# Patient Record
Sex: FEMALE | Race: WHITE | NOT HISPANIC OR LATINO | URBAN - METROPOLITAN AREA
[De-identification: names, ages, dates, MRNs, and addresses within clinical notes are randomized per-mention and may not be internally consistent; named-entity substitution may affect disease eponyms.]

---

## 2020-07-06 ENCOUNTER — OFFICE VISIT (OUTPATIENT)
Dept: URBAN - METROPOLITAN AREA CLINIC 98 | Facility: CLINIC | Age: 80
End: 2020-07-06
Payer: MEDICARE

## 2020-07-06 DIAGNOSIS — K74.3 PRIMARY BILIARY CHOLANGITIS: ICD-10-CM

## 2020-07-06 PROCEDURE — G9903 PT SCRN TBCO ID AS NON USER: HCPCS | Performed by: INTERNAL MEDICINE

## 2020-07-06 PROCEDURE — G8417 CALC BMI ABV UP PARAM F/U: HCPCS | Performed by: INTERNAL MEDICINE

## 2020-07-06 PROCEDURE — G8427 DOCREV CUR MEDS BY ELIG CLIN: HCPCS | Performed by: INTERNAL MEDICINE

## 2020-07-06 PROCEDURE — 99213 OFFICE O/P EST LOW 20 MIN: CPT | Performed by: INTERNAL MEDICINE

## 2020-07-06 RX ORDER — METOPROLOL SUCCINATE 25 MG/1
TABLET, EXTENDED RELEASE ORAL
Qty: 0 | Refills: 0 | Status: ACTIVE | COMMUNITY
Start: 1900-01-01

## 2020-07-06 RX ORDER — LINACLOTIDE 72 UG/1
TAKE 1 CAPSULE BY ORAL ROUTE DAILY FOR 90 DAYS CAPSULE, GELATIN COATED ORAL 1
Qty: 90 | Refills: 3 | Status: ACTIVE | COMMUNITY
Start: 2019-08-27 | End: 2020-08-21

## 2020-07-06 RX ORDER — LOSARTAN POTASSIUM 100 MG/1
TABLET, FILM COATED ORAL
Qty: 0 | Refills: 0 | Status: ACTIVE | COMMUNITY
Start: 1900-01-01

## 2020-07-06 RX ORDER — URSODIOL 300 MG/1
TAKE 2 CAPSULES BY ORAL ROUTE 2 TIMES A DAY FOR 90 DAYS CAPSULE ORAL 2
Qty: 360 | Refills: 3 | Status: ACTIVE | COMMUNITY
Start: 2020-04-09 | End: 2021-04-04

## 2020-07-06 NOTE — HPI-OTHER HISTORIES
Labs :   GGT 9  LDL 67 Tchol 151 TG 64 hb 14.7 plt 170 cr 0.9 Tbili 0.6 [id number]  alt12 ast 22 vit d 40 tsh 3.4

## 2020-07-06 NOTE — HPI-TODAY'S VISIT:
Here to f/u; has gained some weight - cooking for grandson who is living w her now.  otherwise no new issues.  no more itching (except in vaginal area) .  tolerating Ursodiol - got a good deal on [facility]

## 2020-07-07 ENCOUNTER — OFFICE VISIT (OUTPATIENT)
Dept: URBAN - METROPOLITAN AREA CLINIC 98 | Facility: CLINIC | Age: 80
End: 2020-07-07

## 2020-12-16 LAB
A/G RATIO: 1.9
ALBUMIN: 4.1
ALKALINE PHOSPHATASE: 117
ALT (SGPT): 10
AST (SGOT): 21
BASO (ABSOLUTE): 0.1
BASOS: 1
BILIRUBIN, TOTAL: 0.5
BUN/CREATININE RATIO: 26
BUN: 24
CALCIUM: 9.5
CARBON DIOXIDE, TOTAL: 22
CHLORIDE: 106
CREATININE: 0.93
EGFR IF AFRICN AM: 68
EGFR IF NONAFRICN AM: 59
EOS (ABSOLUTE): 0.2
EOS: 4
GGT: 9
GLOBULIN, TOTAL: 2.2
GLUCOSE: 93
HEMATOCRIT: 44.6
HEMATOLOGY COMMENTS:: (no result)
HEMOGLOBIN: 14.7
IMMATURE CELLS: (no result)
IMMATURE GRANS (ABS): 0
IMMATURE GRANULOCYTES: 0
LYMPHS (ABSOLUTE): 1.6
LYMPHS: 28
MCH: 31.3
MCHC: 33
MCV: 95
MONOCYTES(ABSOLUTE): 0.5
MONOCYTES: 10
NEUTROPHILS (ABSOLUTE): 3.2
NEUTROPHILS: 57
NRBC: (no result)
PLATELETS: 178
POTASSIUM: 4.4
PROTEIN, TOTAL: 6.3
RBC: 4.69
RDW: 12.7
SODIUM: 142
WBC: 5.7

## 2021-01-04 ENCOUNTER — OFFICE VISIT (OUTPATIENT)
Dept: URBAN - METROPOLITAN AREA CLINIC 98 | Facility: CLINIC | Age: 81
End: 2021-01-04
Payer: MEDICARE

## 2021-01-04 VITALS
SYSTOLIC BLOOD PRESSURE: 143 MMHG | BODY MASS INDEX: 38.72 KG/M2 | DIASTOLIC BLOOD PRESSURE: 82 MMHG | WEIGHT: 210.4 LBS | TEMPERATURE: 96 F | HEART RATE: 66 BPM | HEIGHT: 62 IN

## 2021-01-04 DIAGNOSIS — L29.9 ITCHING: ICD-10-CM

## 2021-01-04 DIAGNOSIS — K74.3 PRIMARY BILIARY CHOLANGITIS: ICD-10-CM

## 2021-01-04 DIAGNOSIS — K59.09 CHRONIC CONSTIPATION: ICD-10-CM

## 2021-01-04 PROCEDURE — G8482 FLU IMMUNIZE ORDER/ADMIN: HCPCS | Performed by: INTERNAL MEDICINE

## 2021-01-04 PROCEDURE — G9903 PT SCRN TBCO ID AS NON USER: HCPCS | Performed by: INTERNAL MEDICINE

## 2021-01-04 PROCEDURE — G8417 CALC BMI ABV UP PARAM F/U: HCPCS | Performed by: INTERNAL MEDICINE

## 2021-01-04 PROCEDURE — G8427 DOCREV CUR MEDS BY ELIG CLIN: HCPCS | Performed by: INTERNAL MEDICINE

## 2021-01-04 PROCEDURE — 99214 OFFICE O/P EST MOD 30 MIN: CPT | Performed by: INTERNAL MEDICINE

## 2021-01-04 RX ORDER — LOSARTAN POTASSIUM 100 MG/1
TABLET, FILM COATED ORAL
Qty: 0 | Refills: 0 | Status: ACTIVE | COMMUNITY
Start: 1900-01-01

## 2021-01-04 RX ORDER — URSODIOL 300 MG/1
TAKE 2 CAPSULES BY ORAL ROUTE 2 TIMES A DAY FOR 90 DAYS CAPSULE ORAL 2
Qty: 360 | Refills: 3 | Status: ACTIVE | COMMUNITY
Start: 2020-04-09 | End: 2021-04-04

## 2021-01-04 RX ORDER — METOPROLOL SUCCINATE 25 MG/1
TABLET, EXTENDED RELEASE ORAL
Qty: 0 | Refills: 0 | COMMUNITY
Start: 1900-01-01

## 2021-01-04 NOTE — HPI-TODAY'S VISIT:
did itch some over grabiel  - but she thinks more due to stress. grandson lives with her  haven't been back to the pool.   going to orthopod - may need knee surgery bilaterally. shots are ineffective.

## 2021-01-06 ENCOUNTER — LAB OUTSIDE AN ENCOUNTER (OUTPATIENT)
Dept: URBAN - METROPOLITAN AREA CLINIC 98 | Facility: CLINIC | Age: 81
End: 2021-01-06

## 2021-05-27 ENCOUNTER — LAB OUTSIDE AN ENCOUNTER (OUTPATIENT)
Dept: URBAN - METROPOLITAN AREA CLINIC 98 | Facility: CLINIC | Age: 81
End: 2021-05-27

## 2021-05-28 LAB
A/G RATIO: 1.7
ALBUMIN: 3.8
ALKALINE PHOSPHATASE: 116
ALT (SGPT): 11
AST (SGOT): 24
BASO (ABSOLUTE): 0.1
BASOS: 1
BILIRUBIN, TOTAL: 0.4
BUN/CREATININE RATIO: 25
BUN: 25
CALCIUM: 9.4
CARBON DIOXIDE, TOTAL: 23
CHLORIDE: 109
CREATININE: 1
EGFR IF AFRICN AM: 62
EGFR IF NONAFRICN AM: 54
EOS (ABSOLUTE): 0.3
EOS: 7
GLOBULIN, TOTAL: 2.3
GLUCOSE: 100
HEMATOCRIT: 43.3
HEMATOLOGY COMMENTS:: (no result)
HEMOGLOBIN: 14.5
IMMATURE CELLS: (no result)
IMMATURE GRANS (ABS): 0
IMMATURE GRANULOCYTES: 0
LYMPHS (ABSOLUTE): 0.9
LYMPHS: 25
MCH: 31
MCHC: 33.5
MCV: 93
MONOCYTES(ABSOLUTE): 0.4
MONOCYTES: 10
NEUTROPHILS (ABSOLUTE): 2.2
NEUTROPHILS: 57
NRBC: (no result)
PLATELETS: 167
POTASSIUM: 4.4
PROTEIN, TOTAL: 6.1
RBC: 4.68
RDW: 13.1
SODIUM: 142
WBC: 3.8

## 2021-06-04 ENCOUNTER — LAB OUTSIDE AN ENCOUNTER (OUTPATIENT)
Dept: URBAN - METROPOLITAN AREA CLINIC 98 | Facility: CLINIC | Age: 81
End: 2021-06-04

## 2021-06-04 ENCOUNTER — OFFICE VISIT (OUTPATIENT)
Dept: URBAN - METROPOLITAN AREA CLINIC 98 | Facility: CLINIC | Age: 81
End: 2021-06-04
Payer: MEDICARE

## 2021-06-04 VITALS
TEMPERATURE: 97.3 F | DIASTOLIC BLOOD PRESSURE: 78 MMHG | BODY MASS INDEX: 37.32 KG/M2 | HEIGHT: 62 IN | HEART RATE: 65 BPM | SYSTOLIC BLOOD PRESSURE: 129 MMHG | WEIGHT: 202.8 LBS

## 2021-06-04 DIAGNOSIS — K74.3 PRIMARY BILIARY CHOLANGITIS: ICD-10-CM

## 2021-06-04 DIAGNOSIS — K59.09 CHRONIC CONSTIPATION: ICD-10-CM

## 2021-06-04 DIAGNOSIS — E55.9 VITAMIN D DEFICIENCY: ICD-10-CM

## 2021-06-04 DIAGNOSIS — Z86.010 PERSONAL HISTORY OF COLONIC POLYPS: ICD-10-CM

## 2021-06-04 PROBLEM — 443381000124105 OBESE CLASS II: Status: ACTIVE | Noted: 2021-06-03

## 2021-06-04 PROCEDURE — 99214 OFFICE O/P EST MOD 30 MIN: CPT | Performed by: INTERNAL MEDICINE

## 2021-06-04 RX ORDER — LOSARTAN POTASSIUM 100 MG/1
TABLET, FILM COATED ORAL
Qty: 0 | Refills: 0 | Status: ACTIVE | COMMUNITY
Start: 1900-01-01

## 2021-06-04 RX ORDER — URSODIOL 500 MG/1
1 TABLET TABLET ORAL TWICE A DAY
Qty: 180 TABLET | Refills: 3
Start: 2020-04-09

## 2021-06-04 RX ORDER — METOPROLOL SUCCINATE 25 MG/1
TABLET, EXTENDED RELEASE ORAL
Qty: 0 | Refills: 0 | COMMUNITY
Start: 1900-01-01

## 2021-06-04 RX ORDER — SODIUM, POTASSIUM,MAG SULFATES 17.5-3.13G
177ML SOLUTION, RECONSTITUTED, ORAL ORAL AS DIRECTED
Qty: 1 KIT | Refills: 0 | OUTPATIENT
Start: 2021-06-04 | End: 2021-06-05

## 2021-06-04 NOTE — HPI-TODAY'S VISIT:
Just diagnosed with lichen sclerosis - now in genital area.  rec'd clobetasol proprionate ointment.   still straining at stool.  still getting kylie.   doing WW - but  just got her first great grandchild.

## 2021-06-14 ENCOUNTER — TELEPHONE ENCOUNTER (OUTPATIENT)
Dept: URBAN - METROPOLITAN AREA CLINIC 23 | Facility: CLINIC | Age: 81
End: 2021-06-14

## 2021-06-15 ENCOUNTER — TELEPHONE ENCOUNTER (OUTPATIENT)
Dept: URBAN - METROPOLITAN AREA CLINIC 98 | Facility: CLINIC | Age: 81
End: 2021-06-15

## 2021-07-04 ENCOUNTER — LAB OUTSIDE AN ENCOUNTER (OUTPATIENT)
Dept: URBAN - METROPOLITAN AREA CLINIC 98 | Facility: CLINIC | Age: 81
End: 2021-07-04

## 2021-07-06 ENCOUNTER — OFFICE VISIT (OUTPATIENT)
Dept: URBAN - METROPOLITAN AREA CLINIC 98 | Facility: CLINIC | Age: 81
End: 2021-07-06

## 2021-07-21 ENCOUNTER — CLAIMS CREATED FROM THE CLAIM WINDOW (OUTPATIENT)
Dept: URBAN - METROPOLITAN AREA CLINIC 4 | Facility: CLINIC | Age: 81
End: 2021-07-21
Payer: MEDICARE

## 2021-07-21 ENCOUNTER — OFFICE VISIT (OUTPATIENT)
Dept: URBAN - METROPOLITAN AREA SURGERY CENTER 18 | Facility: SURGERY CENTER | Age: 81
End: 2021-07-21
Payer: MEDICARE

## 2021-07-21 DIAGNOSIS — D12.4 ADENOMA OF DESCENDING COLON: ICD-10-CM

## 2021-07-21 DIAGNOSIS — K63.89 POLYP OF ILEUM: ICD-10-CM

## 2021-07-21 DIAGNOSIS — Z86.010 H/O ADENOMATOUS POLYP OF COLON: ICD-10-CM

## 2021-07-21 DIAGNOSIS — D12.4 BENIGN NEOPLASM OF DESCENDING COLON: ICD-10-CM

## 2021-07-21 PROCEDURE — 88305 TISSUE EXAM BY PATHOLOGIST: CPT | Performed by: PATHOLOGY

## 2021-07-21 PROCEDURE — 45380 COLONOSCOPY AND BIOPSY: CPT | Performed by: INTERNAL MEDICINE

## 2021-07-21 PROCEDURE — G8907 PT DOC NO EVENTS ON DISCHARG: HCPCS | Performed by: INTERNAL MEDICINE

## 2021-07-21 RX ORDER — METOPROLOL SUCCINATE 25 MG/1
TABLET, EXTENDED RELEASE ORAL
Qty: 0 | Refills: 0 | COMMUNITY
Start: 1900-01-01

## 2021-07-21 RX ORDER — LOSARTAN POTASSIUM 100 MG/1
TABLET, FILM COATED ORAL
Qty: 0 | Refills: 0 | Status: ACTIVE | COMMUNITY
Start: 1900-01-01

## 2021-07-21 RX ORDER — URSODIOL 500 MG/1
1 TABLET TABLET ORAL TWICE A DAY
Qty: 180 TABLET | Refills: 3 | Status: ACTIVE | COMMUNITY
Start: 2020-04-09

## 2021-07-23 ENCOUNTER — OFFICE VISIT (OUTPATIENT)
Dept: URBAN - METROPOLITAN AREA MEDICAL CENTER 28 | Facility: MEDICAL CENTER | Age: 81
End: 2021-07-23

## 2021-11-30 ENCOUNTER — LAB OUTSIDE AN ENCOUNTER (OUTPATIENT)
Dept: URBAN - METROPOLITAN AREA CLINIC 98 | Facility: CLINIC | Age: 81
End: 2021-11-30

## 2021-12-01 LAB
A/G RATIO: 1.7
ALBUMIN: 3.9
ALKALINE PHOSPHATASE: 126
ALT (SGPT): 9
AST (SGOT): 18
BASO (ABSOLUTE): 0.1
BASOS: 1
BILIRUBIN, TOTAL: 0.6
BUN/CREATININE RATIO: 19
BUN: 19
CALCIUM: 9.6
CARBON DIOXIDE, TOTAL: 23
CHLORIDE: 105
CREATININE: 0.98
EGFR IF AFRICN AM: 63
EGFR IF NONAFRICN AM: 55
EOS (ABSOLUTE): 0.2
EOS: 3
GGT: 6
GLOBULIN, TOTAL: 2.3
GLUCOSE: 90
HEMATOCRIT: 41.3
HEMATOLOGY COMMENTS:: (no result)
HEMOGLOBIN: 13.1
IMMATURE CELLS: (no result)
IMMATURE GRANS (ABS): 0
IMMATURE GRANULOCYTES: 0
LYMPHS (ABSOLUTE): 1.3
LYMPHS: 23
MCH: 29
MCHC: 31.7
MCV: 91
MONOCYTES(ABSOLUTE): 0.5
MONOCYTES: 9
NEUTROPHILS (ABSOLUTE): 3.7
NEUTROPHILS: 64
NRBC: (no result)
PLATELETS: 167
POTASSIUM: 4.5
PROTEIN, TOTAL: 6.2
RBC: 4.52
RDW: 11.8
SODIUM: 142
VITAMIN D, 25-HYDROXY: 42.9
WBC: 5.7

## 2021-12-04 ENCOUNTER — LAB OUTSIDE AN ENCOUNTER (OUTPATIENT)
Dept: URBAN - METROPOLITAN AREA CLINIC 98 | Facility: CLINIC | Age: 81
End: 2021-12-04

## 2021-12-06 ENCOUNTER — OFFICE VISIT (OUTPATIENT)
Dept: URBAN - METROPOLITAN AREA CLINIC 98 | Facility: CLINIC | Age: 81
End: 2021-12-06
Payer: MEDICARE

## 2021-12-06 VITALS
SYSTOLIC BLOOD PRESSURE: 132 MMHG | WEIGHT: 197.4 LBS | HEART RATE: 71 BPM | BODY MASS INDEX: 36.33 KG/M2 | TEMPERATURE: 97.4 F | DIASTOLIC BLOOD PRESSURE: 77 MMHG | HEIGHT: 62 IN

## 2021-12-06 DIAGNOSIS — E55.9 VITAMIN D DEFICIENCY: ICD-10-CM

## 2021-12-06 DIAGNOSIS — K74.3 PRIMARY BILIARY CHOLANGITIS: ICD-10-CM

## 2021-12-06 DIAGNOSIS — K59.09 CHRONIC CONSTIPATION: ICD-10-CM

## 2021-12-06 PROCEDURE — 99214 OFFICE O/P EST MOD 30 MIN: CPT | Performed by: INTERNAL MEDICINE

## 2021-12-06 RX ORDER — URSODIOL 500 MG/1
1 TABLET TABLET ORAL TWICE A DAY
Qty: 180 TABLET | Refills: 3 | Status: ACTIVE | COMMUNITY
Start: 2020-04-09

## 2021-12-06 RX ORDER — LOSARTAN POTASSIUM 100 MG/1
TABLET, FILM COATED ORAL
Qty: 0 | Refills: 0 | Status: ACTIVE | COMMUNITY
Start: 1900-01-01

## 2021-12-06 RX ORDER — HYDROXYZINE HYDROCHLORIDE 25 MG/1
1 TABLET AS NEEDED TABLET, FILM COATED ORAL
Qty: 120 TABLET | Refills: 1 | OUTPATIENT
Start: 2021-12-06

## 2021-12-06 NOTE — HPI-OTHER HISTORIES
HEre w her daughter- with some bad news to report.  In August - had knee replacement - had complications including poor wound healing and got blood clots - cannot bend her knee now, so she cannot drive herself and needs to use a walker to move around.  No GI issues, no abdominal pain.

## 2022-05-17 ENCOUNTER — TELEPHONE ENCOUNTER (OUTPATIENT)
Dept: URBAN - METROPOLITAN AREA CLINIC 98 | Facility: CLINIC | Age: 82
End: 2022-05-17

## 2022-05-17 RX ORDER — URSODIOL 500 MG/1
1 TABLET TABLET ORAL TWICE A DAY
Qty: 180 | Refills: 3

## 2022-06-06 ENCOUNTER — LAB OUTSIDE AN ENCOUNTER (OUTPATIENT)
Dept: URBAN - METROPOLITAN AREA CLINIC 98 | Facility: CLINIC | Age: 82
End: 2022-06-06

## 2022-06-13 ENCOUNTER — OFFICE VISIT (OUTPATIENT)
Dept: URBAN - METROPOLITAN AREA CLINIC 98 | Facility: CLINIC | Age: 82
End: 2022-06-13

## 2022-06-16 LAB
A/G RATIO: 1.6
ALBUMIN: 3.7
ALKALINE PHOSPHATASE: 99
ALT (SGPT): 14
AST (SGOT): 25
BASO (ABSOLUTE): 0.1
BASOS: 1
BILIRUBIN, TOTAL: 0.7
BUN/CREATININE RATIO: 34
BUN: 33
CALCIUM: 9.5
CARBON DIOXIDE, TOTAL: 23
CHLORIDE: 107
CREATININE: 0.98
EGFR: 59
EOS (ABSOLUTE): 0.2
EOS: 3
GGT: 6
GLOBULIN, TOTAL: 2.3
GLUCOSE: 77
HEMATOCRIT: 38.6
HEMATOLOGY COMMENTS:: (no result)
HEMOGLOBIN: 12.6
IMMATURE CELLS: (no result)
IMMATURE GRANS (ABS): 0
IMMATURE GRANULOCYTES: 0
LYMPHS (ABSOLUTE): 1.2
LYMPHS: 22
MCH: 31
MCHC: 32.6
MCV: 95
MONOCYTES(ABSOLUTE): 0.6
MONOCYTES: 12
NEUTROPHILS (ABSOLUTE): 3.2
NEUTROPHILS: 62
NRBC: (no result)
PLATELETS: 128
POTASSIUM: 4.8
PROTEIN, TOTAL: 6
RBC: 4.06
RDW: 12.9
SODIUM: 141
VITAMIN D, 25-HYDROXY: 47.4
WBC: 5.2

## 2022-06-17 ENCOUNTER — WEB ENCOUNTER (OUTPATIENT)
Dept: URBAN - METROPOLITAN AREA CLINIC 98 | Facility: CLINIC | Age: 82
End: 2022-06-17

## 2022-06-17 ENCOUNTER — OFFICE VISIT (OUTPATIENT)
Dept: URBAN - METROPOLITAN AREA CLINIC 98 | Facility: CLINIC | Age: 82
End: 2022-06-17
Payer: MEDICARE

## 2022-06-17 VITALS
HEIGHT: 62 IN | SYSTOLIC BLOOD PRESSURE: 123 MMHG | DIASTOLIC BLOOD PRESSURE: 65 MMHG | TEMPERATURE: 97 F | BODY MASS INDEX: 38.28 KG/M2 | WEIGHT: 208 LBS | HEART RATE: 54 BPM

## 2022-06-17 DIAGNOSIS — K74.3 PRIMARY BILIARY CHOLANGITIS: ICD-10-CM

## 2022-06-17 DIAGNOSIS — K59.09 CHRONIC CONSTIPATION: ICD-10-CM

## 2022-06-17 DIAGNOSIS — E55.9 VITAMIN D DEFICIENCY: ICD-10-CM

## 2022-06-17 DIAGNOSIS — D69.6 THROMBOCYTOPENIA: ICD-10-CM

## 2022-06-17 PROCEDURE — 99214 OFFICE O/P EST MOD 30 MIN: CPT | Performed by: INTERNAL MEDICINE

## 2022-06-17 RX ORDER — HYDROXYZINE HYDROCHLORIDE 25 MG/1
1 TABLET AS NEEDED TABLET, FILM COATED ORAL
Qty: 120 TABLET | Refills: 1 | Status: ON HOLD | COMMUNITY
Start: 2021-12-06

## 2022-06-17 RX ORDER — LOSARTAN POTASSIUM 100 MG/1
TABLET, FILM COATED ORAL
Qty: 0 | Refills: 0 | Status: ACTIVE | COMMUNITY
Start: 1900-01-01

## 2022-06-17 RX ORDER — HYDROXYZINE HYDROCHLORIDE 25 MG/1
1 TABLET AS NEEDED TABLET, FILM COATED ORAL
Qty: 120 TABLET | Refills: 1 | OUTPATIENT

## 2022-06-17 RX ORDER — URSODIOL 500 MG/1
1 TABLET TABLET ORAL TWICE A DAY
Qty: 180 | Refills: 3 | Status: ACTIVE | COMMUNITY

## 2022-06-17 NOTE — HPI-OTHER HISTORIES
went to doctor on Monday - she heard an irregular HR - had to wear a monitor for 3 days.  asymptomatic  saw Cardiologist- in Afib - now on Sotalol having more swelling now in legs still can't bend knee

## 2022-06-28 ENCOUNTER — LAB OUTSIDE AN ENCOUNTER (OUTPATIENT)
Dept: URBAN - METROPOLITAN AREA CLINIC 98 | Facility: CLINIC | Age: 82
End: 2022-06-28

## 2022-07-04 ENCOUNTER — TELEPHONE ENCOUNTER (OUTPATIENT)
Dept: URBAN - METROPOLITAN AREA CLINIC 98 | Facility: CLINIC | Age: 82
End: 2022-07-04

## 2022-12-12 ENCOUNTER — LAB OUTSIDE AN ENCOUNTER (OUTPATIENT)
Dept: URBAN - METROPOLITAN AREA CLINIC 98 | Facility: CLINIC | Age: 82
End: 2022-12-12

## 2022-12-13 LAB
A/G RATIO: 1.4
ALBUMIN: 3.8
ALKALINE PHOSPHATASE: 114
ALT (SGPT): 8
AST (SGOT): 21
BASO (ABSOLUTE): 0.1
BASOS: 1
BILIRUBIN, TOTAL: 0.6
BUN/CREATININE RATIO: 19
BUN: 19
CALCIUM: 9.9
CARBON DIOXIDE, TOTAL: 25
CHLORIDE: 107
CREATININE: 0.98
EGFR: 58
EOS (ABSOLUTE): 0.3
EOS: 7
GGT: 7
GLOBULIN, TOTAL: 2.7
GLUCOSE: 86
HEMATOCRIT: 43.5
HEMATOLOGY COMMENTS:: (no result)
HEMOGLOBIN: 14.5
IMMATURE CELLS: (no result)
IMMATURE GRANS (ABS): 0
IMMATURE GRANULOCYTES: 0
LYMPHS (ABSOLUTE): 1.4
LYMPHS: 31
MCH: 31.8
MCHC: 33.3
MCV: 95
MONOCYTES(ABSOLUTE): 0.5
MONOCYTES: 10
NEUTROPHILS (ABSOLUTE): 2.3
NEUTROPHILS: 51
NRBC: (no result)
PLATELETS: 145
POTASSIUM: 4.7
PROTEIN, TOTAL: 6.5
RBC: 4.56
RDW: 11.6
SODIUM: 142
VITAMIN D, 25-HYDROXY: 41.3
WBC: 4.6

## 2022-12-17 ENCOUNTER — LAB OUTSIDE AN ENCOUNTER (OUTPATIENT)
Dept: URBAN - METROPOLITAN AREA CLINIC 98 | Facility: CLINIC | Age: 82
End: 2022-12-17

## 2022-12-19 ENCOUNTER — LAB OUTSIDE AN ENCOUNTER (OUTPATIENT)
Dept: URBAN - METROPOLITAN AREA CLINIC 98 | Facility: CLINIC | Age: 82
End: 2022-12-19

## 2022-12-19 ENCOUNTER — WEB ENCOUNTER (OUTPATIENT)
Dept: URBAN - METROPOLITAN AREA CLINIC 98 | Facility: CLINIC | Age: 82
End: 2022-12-19

## 2022-12-19 ENCOUNTER — OFFICE VISIT (OUTPATIENT)
Dept: URBAN - METROPOLITAN AREA CLINIC 98 | Facility: CLINIC | Age: 82
End: 2022-12-19
Payer: MEDICARE

## 2022-12-19 VITALS
HEIGHT: 62 IN | BODY MASS INDEX: 37.58 KG/M2 | SYSTOLIC BLOOD PRESSURE: 176 MMHG | HEART RATE: 61 BPM | TEMPERATURE: 96.9 F | DIASTOLIC BLOOD PRESSURE: 77 MMHG | WEIGHT: 204.2 LBS

## 2022-12-19 DIAGNOSIS — D69.6 THROMBOCYTOPENIA: ICD-10-CM

## 2022-12-19 DIAGNOSIS — K59.09 CHRONIC CONSTIPATION: ICD-10-CM

## 2022-12-19 DIAGNOSIS — K74.3 PRIMARY BILIARY CHOLANGITIS: ICD-10-CM

## 2022-12-19 DIAGNOSIS — E55.9 VITAMIN D DEFICIENCY: ICD-10-CM

## 2022-12-19 PROBLEM — 415116008: Status: ACTIVE | Noted: 2022-06-17

## 2022-12-19 PROBLEM — 34713006: Status: ACTIVE | Noted: 2021-06-04

## 2022-12-19 PROCEDURE — 99214 OFFICE O/P EST MOD 30 MIN: CPT | Performed by: INTERNAL MEDICINE

## 2022-12-19 RX ORDER — HYDROXYZINE HYDROCHLORIDE 25 MG/1
1 TABLET AS NEEDED TABLET, FILM COATED ORAL
Qty: 120 TABLET | Refills: 1 | Status: ON HOLD | COMMUNITY

## 2022-12-19 RX ORDER — URSODIOL 500 MG/1
1 TABLET TABLET ORAL TWICE A DAY
Qty: 180 | Refills: 3 | Status: ACTIVE | COMMUNITY

## 2022-12-19 RX ORDER — LOSARTAN POTASSIUM 100 MG/1
TABLET, FILM COATED ORAL
Qty: 0 | Refills: 0 | Status: ACTIVE | COMMUNITY
Start: 1900-01-01

## 2022-12-19 RX ORDER — HYDROXYZINE HYDROCHLORIDE 25 MG/1
1 TABLET AS NEEDED TABLET, FILM COATED ORAL
Qty: 120 TABLET | Refills: 1 | OUTPATIENT

## 2022-12-19 NOTE — HPI-TODAY'S VISIT:
Here for routine follow up  reports she had a fall at home resulting in large laceration in leg and couldn't get up, large bruising across her belly leg has been worse after she fell  6/2022 : fatty liver, small liver cyst, no splenomegaly itching from time to time

## 2023-04-26 ENCOUNTER — ERX REFILL RESPONSE (OUTPATIENT)
Dept: URBAN - METROPOLITAN AREA CLINIC 98 | Facility: CLINIC | Age: 83
End: 2023-04-26

## 2023-04-26 RX ORDER — URSODIOL 500 MG/1
1 TABLET TABLET ORAL TWICE A DAY
Qty: 180 | Refills: 3 | OUTPATIENT

## 2023-06-09 ENCOUNTER — LAB OUTSIDE AN ENCOUNTER (OUTPATIENT)
Dept: URBAN - METROPOLITAN AREA CLINIC 98 | Facility: CLINIC | Age: 83
End: 2023-06-09

## 2023-06-10 LAB — VITAMIN D, 25-HYDROXY: 37.3

## 2023-06-15 ENCOUNTER — TELEPHONE ENCOUNTER (OUTPATIENT)
Dept: URBAN - METROPOLITAN AREA CLINIC 6 | Facility: CLINIC | Age: 83
End: 2023-06-15

## 2023-06-16 ENCOUNTER — WEB ENCOUNTER (OUTPATIENT)
Dept: URBAN - METROPOLITAN AREA CLINIC 98 | Facility: CLINIC | Age: 83
End: 2023-06-16

## 2023-06-16 ENCOUNTER — OFFICE VISIT (OUTPATIENT)
Dept: URBAN - METROPOLITAN AREA CLINIC 98 | Facility: CLINIC | Age: 83
End: 2023-06-16
Payer: MEDICARE

## 2023-06-16 VITALS
WEIGHT: 214.6 LBS | TEMPERATURE: 97 F | HEIGHT: 62 IN | SYSTOLIC BLOOD PRESSURE: 134 MMHG | HEART RATE: 66 BPM | BODY MASS INDEX: 39.49 KG/M2 | DIASTOLIC BLOOD PRESSURE: 66 MMHG

## 2023-06-16 DIAGNOSIS — K59.09 CHRONIC CONSTIPATION: ICD-10-CM

## 2023-06-16 DIAGNOSIS — K74.3 PRIMARY BILIARY CHOLANGITIS: ICD-10-CM

## 2023-06-16 DIAGNOSIS — L29.9 ITCHING: ICD-10-CM

## 2023-06-16 DIAGNOSIS — D69.6 THROMBOCYTOPENIA: ICD-10-CM

## 2023-06-16 DIAGNOSIS — E55.9 VITAMIN D DEFICIENCY: ICD-10-CM

## 2023-06-16 PROCEDURE — 99214 OFFICE O/P EST MOD 30 MIN: CPT | Performed by: INTERNAL MEDICINE

## 2023-06-16 RX ORDER — HYDROXYZINE HYDROCHLORIDE 10 MG/1
1 TABLET AS NEEDED TABLET, FILM COATED ORAL
Qty: 120 TABLET | Refills: 1 | OUTPATIENT

## 2023-06-16 RX ORDER — LOSARTAN POTASSIUM 100 MG/1
TABLET, FILM COATED ORAL
Qty: 0 | Refills: 0 | Status: ACTIVE | COMMUNITY
Start: 1900-01-01

## 2023-06-16 RX ORDER — HYDROXYZINE HYDROCHLORIDE 25 MG/1
1 TABLET AS NEEDED TABLET, FILM COATED ORAL
Qty: 120 TABLET | Refills: 1 | Status: ACTIVE | COMMUNITY

## 2023-06-16 RX ORDER — URSODIOL 500 MG/1
1 TABLET TABLET ORAL TWICE A DAY
Qty: 180 | Refills: 3 | Status: ACTIVE | COMMUNITY

## 2023-06-19 ENCOUNTER — LAB OUTSIDE AN ENCOUNTER (OUTPATIENT)
Dept: URBAN - METROPOLITAN AREA CLINIC 98 | Facility: CLINIC | Age: 83
End: 2023-06-19

## 2023-12-06 ENCOUNTER — LAB OUTSIDE AN ENCOUNTER (OUTPATIENT)
Dept: URBAN - METROPOLITAN AREA CLINIC 98 | Facility: CLINIC | Age: 83
End: 2023-12-06

## 2023-12-07 LAB
A/G RATIO: 1.7
ALBUMIN: 3.9
ALKALINE PHOSPHATASE: 94
ALT (SGPT): 9
AST (SGOT): 23
BASO (ABSOLUTE): 0.1
BASOS: 1
BILIRUBIN, TOTAL: 0.6
BUN/CREATININE RATIO: 23
BUN: 21
CALCIUM: 9.7
CARBON DIOXIDE, TOTAL: 24
CHLORIDE: 109
CREATININE: 0.9
EGFR: 64
EOS (ABSOLUTE): 0.2
EOS: 5
GGT: 5
GLOBULIN, TOTAL: 2.3
GLUCOSE: 92
HEMATOCRIT: 39.6
HEMATOLOGY COMMENTS:: (no result)
HEMOGLOBIN: 13.3
IMMATURE CELLS: (no result)
IMMATURE GRANS (ABS): 0
IMMATURE GRANULOCYTES: 0
IMMUNOGLOBULIN A, QN, SERUM: 195
IMMUNOGLOBULIN G, QN, SERUM: 891
IMMUNOGLOBULIN M, QN, SERUM: 242
LYMPHS (ABSOLUTE): 1.1
LYMPHS: 26
MCH: 32
MCHC: 33.6
MCV: 95
MONOCYTES(ABSOLUTE): 0.5
MONOCYTES: 12
NEUTROPHILS (ABSOLUTE): 2.4
NEUTROPHILS: 56
NRBC: (no result)
PLATELETS: 137
POTASSIUM: 4.8
PROTEIN, TOTAL: 6.2
RBC: 4.15
RDW: 12.3
SODIUM: 144
WBC: 4.2

## 2023-12-15 ENCOUNTER — OFFICE VISIT (OUTPATIENT)
Dept: URBAN - METROPOLITAN AREA CLINIC 98 | Facility: CLINIC | Age: 83
End: 2023-12-15
Payer: MEDICARE

## 2023-12-15 ENCOUNTER — DASHBOARD ENCOUNTERS (OUTPATIENT)
Age: 83
End: 2023-12-15

## 2023-12-15 ENCOUNTER — TELEPHONE ENCOUNTER (OUTPATIENT)
Dept: URBAN - METROPOLITAN AREA CLINIC 98 | Facility: CLINIC | Age: 83
End: 2023-12-15

## 2023-12-15 ENCOUNTER — LAB OUTSIDE AN ENCOUNTER (OUTPATIENT)
Dept: URBAN - METROPOLITAN AREA CLINIC 98 | Facility: CLINIC | Age: 83
End: 2023-12-15

## 2023-12-15 VITALS
DIASTOLIC BLOOD PRESSURE: 60 MMHG | TEMPERATURE: 97.1 F | HEIGHT: 62 IN | BODY MASS INDEX: 37.36 KG/M2 | WEIGHT: 203 LBS | HEART RATE: 59 BPM | SYSTOLIC BLOOD PRESSURE: 135 MMHG

## 2023-12-15 DIAGNOSIS — E55.9 VITAMIN D DEFICIENCY: ICD-10-CM

## 2023-12-15 DIAGNOSIS — Z86.010 PERSONAL HISTORY OF COLONIC POLYPS: ICD-10-CM

## 2023-12-15 DIAGNOSIS — K74.3 PRIMARY BILIARY CHOLANGITIS: ICD-10-CM

## 2023-12-15 DIAGNOSIS — K59.09 CHRONIC CONSTIPATION: ICD-10-CM

## 2023-12-15 DIAGNOSIS — L29.9 ITCHING: ICD-10-CM

## 2023-12-15 DIAGNOSIS — D69.6 THROMBOCYTOPENIA: ICD-10-CM

## 2023-12-15 PROBLEM — 428283002: Status: ACTIVE | Noted: 2021-06-04

## 2023-12-15 PROCEDURE — 99214 OFFICE O/P EST MOD 30 MIN: CPT | Performed by: INTERNAL MEDICINE

## 2023-12-15 RX ORDER — LOSARTAN POTASSIUM 100 MG/1
TABLET, FILM COATED ORAL
Qty: 0 | Refills: 0 | Status: ACTIVE | COMMUNITY
Start: 1900-01-01

## 2023-12-15 RX ORDER — HYDROXYZINE HYDROCHLORIDE 10 MG/1
1 TABLET AS NEEDED TABLET, FILM COATED ORAL
Qty: 120 TABLET | Refills: 1 | OUTPATIENT

## 2023-12-15 RX ORDER — HYDROXYZINE HYDROCHLORIDE 10 MG/1
1 TABLET AS NEEDED TABLET, FILM COATED ORAL
Qty: 120 TABLET | Refills: 1 | Status: ACTIVE | COMMUNITY

## 2023-12-15 RX ORDER — URSODIOL 500 MG/1
1 TABLET TABLET ORAL TWICE A DAY
Qty: 180 | Refills: 3 | Status: ACTIVE | COMMUNITY

## 2023-12-15 NOTE — HPI-TODAY'S VISIT:
has Afib on Eliquis  doing well so far  still itching - hydroxyzine is helping a little bit  planning to see a dermatologist due to rash moved and had a lot of stress

## 2023-12-16 ENCOUNTER — LAB OUTSIDE AN ENCOUNTER (OUTPATIENT)
Dept: URBAN - METROPOLITAN AREA CLINIC 98 | Facility: CLINIC | Age: 83
End: 2023-12-16

## 2023-12-27 ENCOUNTER — LAB OUTSIDE AN ENCOUNTER (OUTPATIENT)
Dept: URBAN - METROPOLITAN AREA CLINIC 98 | Facility: CLINIC | Age: 83
End: 2023-12-27

## 2023-12-28 ENCOUNTER — TELEPHONE ENCOUNTER (OUTPATIENT)
Dept: URBAN - METROPOLITAN AREA CLINIC 98 | Facility: CLINIC | Age: 83
End: 2023-12-28

## 2024-06-15 ENCOUNTER — LAB OUTSIDE AN ENCOUNTER (OUTPATIENT)
Dept: URBAN - METROPOLITAN AREA CLINIC 98 | Facility: CLINIC | Age: 84
End: 2024-06-15

## 2024-06-21 LAB
ALBUMIN: 3.9
ALKALINE PHOSPHATASE: 117
ALT (SGPT): 6
AST (SGOT): 22
BASO (ABSOLUTE): 0.1
BASOS: 1
BILIRUBIN, TOTAL: 0.6
BUN/CREATININE RATIO: 26
BUN: 23
CALCIUM: 9.3
CARBON DIOXIDE, TOTAL: 25
CHLORIDE: 107
CREATININE: 0.87
EGFR: 67
EOS (ABSOLUTE): 0.3
EOS: 6
GGT: 7
GLOBULIN, TOTAL: 2.3
GLUCOSE: 83
HEMATOCRIT: 40.3
HEMATOLOGY COMMENTS:: (no result)
HEMOGLOBIN: 13.3
IMMATURE CELLS: (no result)
IMMATURE GRANS (ABS): 0
IMMATURE GRANULOCYTES: 0
LYMPHS (ABSOLUTE): 1.1
LYMPHS: 24
MCH: 32
MCHC: 33
MCV: 97
MONOCYTES(ABSOLUTE): 0.5
MONOCYTES: 10
NEUTROPHILS (ABSOLUTE): 2.7
NEUTROPHILS: 59
NRBC: (no result)
PLATELETS: 129
POTASSIUM: 5.1
PROTEIN, TOTAL: 6.2
RBC: 4.16
RDW: 12.7
SODIUM: 144
VITAMIN D, 25-HYDROXY: 41.8
WBC: 4.6

## 2024-06-24 ENCOUNTER — TELEPHONE ENCOUNTER (OUTPATIENT)
Dept: URBAN - METROPOLITAN AREA CLINIC 98 | Facility: CLINIC | Age: 84
End: 2024-06-24

## 2024-06-27 ENCOUNTER — OFFICE VISIT (OUTPATIENT)
Dept: URBAN - METROPOLITAN AREA CLINIC 98 | Facility: CLINIC | Age: 84
End: 2024-06-27
Payer: MEDICARE

## 2024-06-27 ENCOUNTER — LAB OUTSIDE AN ENCOUNTER (OUTPATIENT)
Dept: URBAN - METROPOLITAN AREA CLINIC 98 | Facility: CLINIC | Age: 84
End: 2024-06-27

## 2024-06-27 VITALS
DIASTOLIC BLOOD PRESSURE: 65 MMHG | BODY MASS INDEX: 40.12 KG/M2 | SYSTOLIC BLOOD PRESSURE: 149 MMHG | TEMPERATURE: 97 F | HEIGHT: 62 IN | HEART RATE: 58 BPM | WEIGHT: 218 LBS

## 2024-06-27 DIAGNOSIS — E55.9 VITAMIN D DEFICIENCY: ICD-10-CM

## 2024-06-27 DIAGNOSIS — K74.3 PRIMARY BILIARY CHOLANGITIS: ICD-10-CM

## 2024-06-27 DIAGNOSIS — K59.09 CHRONIC CONSTIPATION: ICD-10-CM

## 2024-06-27 DIAGNOSIS — R12 HEARTBURN: ICD-10-CM

## 2024-06-27 DIAGNOSIS — D69.6 THROMBOCYTOPENIA: ICD-10-CM

## 2024-06-27 DIAGNOSIS — Z86.010 PERSONAL HISTORY OF COLONIC POLYPS: ICD-10-CM

## 2024-06-27 PROCEDURE — 99214 OFFICE O/P EST MOD 30 MIN: CPT | Performed by: INTERNAL MEDICINE

## 2024-06-27 RX ORDER — URSODIOL 500 MG/1
1 TABLET TABLET ORAL TWICE A DAY
Qty: 180 | Refills: 3 | Status: ACTIVE | COMMUNITY

## 2024-06-27 RX ORDER — POLYETHYLENE GLYCOL 3350, SODIUM SULFATE ANHYDROUS, SODIUM BICARBONATE, SODIUM CHLORIDE, POTASSIUM CHLORIDE 236; 22.74; 6.74; 5.86; 2.97 G/4L; G/4L; G/4L; G/4L; G/4L
4000 ML POWDER, FOR SOLUTION ORAL
Qty: 4000 ML | Refills: 0 | OUTPATIENT
Start: 2024-06-27 | End: 2024-06-28

## 2024-06-27 RX ORDER — HYDROXYZINE HYDROCHLORIDE 10 MG/1
1 TABLET AS NEEDED TABLET, FILM COATED ORAL
Qty: 120 TABLET | Refills: 1 | Status: ON HOLD | COMMUNITY

## 2024-06-27 RX ORDER — LOSARTAN POTASSIUM 100 MG/1
TABLET, FILM COATED ORAL
Qty: 0 | Refills: 0 | Status: ACTIVE | COMMUNITY
Start: 1900-01-01

## 2024-06-27 NOTE — HPI-TODAY'S VISIT:
Has had 2 falls in last few months caused a large bruise on her breast  seeing PCP  willing to use her walker now

## 2024-06-27 NOTE — PHYSICAL EXAM CONSTITUTIONAL:
normal,  alert,  in no acute distress,  well developed, well nourished,  normal communication ability
no chest pain and no edema.

## 2024-07-19 ENCOUNTER — TELEPHONE ENCOUNTER (OUTPATIENT)
Dept: URBAN - METROPOLITAN AREA CLINIC 98 | Facility: CLINIC | Age: 84
End: 2024-07-19

## 2024-07-31 ENCOUNTER — TELEPHONE ENCOUNTER (OUTPATIENT)
Dept: URBAN - METROPOLITAN AREA CLINIC 98 | Facility: CLINIC | Age: 84
End: 2024-07-31

## 2024-09-12 ENCOUNTER — OFFICE VISIT (OUTPATIENT)
Dept: URBAN - METROPOLITAN AREA LAB 3 | Facility: LAB | Age: 84
End: 2024-09-12
Payer: MEDICARE

## 2024-09-12 ENCOUNTER — TELEPHONE ENCOUNTER (OUTPATIENT)
Dept: URBAN - METROPOLITAN AREA CLINIC 98 | Facility: CLINIC | Age: 84
End: 2024-09-12

## 2024-09-12 ENCOUNTER — LAB OUTSIDE AN ENCOUNTER (OUTPATIENT)
Dept: URBAN - METROPOLITAN AREA CLINIC 98 | Facility: CLINIC | Age: 84
End: 2024-09-12

## 2024-09-12 DIAGNOSIS — D12.2 ADENOMA OF ASCENDING COLON: ICD-10-CM

## 2024-09-12 DIAGNOSIS — D12.3 ADENOMA OF TRANSVERSE COLON: ICD-10-CM

## 2024-09-12 DIAGNOSIS — K21.00 ALKALINE REFLUX ESOPHAGITIS: ICD-10-CM

## 2024-09-12 DIAGNOSIS — K63.5 BENIGN COLON POLYP: ICD-10-CM

## 2024-09-12 DIAGNOSIS — K29.60 ADENOPAPILLOMATOSIS GASTRICA: ICD-10-CM

## 2024-09-12 DIAGNOSIS — Z86.010 ADENOMAS PERSONAL HISTORY OF COLONIC POLYPS: ICD-10-CM

## 2024-09-12 PROCEDURE — 45385 COLONOSCOPY W/LESION REMOVAL: CPT | Performed by: INTERNAL MEDICINE

## 2024-09-12 PROCEDURE — 0529F INTRVL 3/>YR PTS CLNSCP DOCD: CPT | Performed by: INTERNAL MEDICINE

## 2024-09-12 PROCEDURE — 43239 EGD BIOPSY SINGLE/MULTIPLE: CPT | Performed by: INTERNAL MEDICINE

## 2024-09-12 PROCEDURE — 45380 COLONOSCOPY AND BIOPSY: CPT | Performed by: INTERNAL MEDICINE

## 2024-09-12 RX ORDER — HYDROXYZINE HYDROCHLORIDE 10 MG/1
1 TABLET AS NEEDED TABLET, FILM COATED ORAL
Qty: 120 TABLET | Refills: 1 | Status: ON HOLD | COMMUNITY

## 2024-09-12 RX ORDER — LOSARTAN POTASSIUM 100 MG/1
TABLET, FILM COATED ORAL
Qty: 0 | Refills: 0 | Status: ACTIVE | COMMUNITY
Start: 1900-01-01

## 2024-09-12 RX ORDER — URSODIOL 500 MG/1
1 TABLET TABLET ORAL TWICE A DAY
Qty: 180 | Refills: 3 | Status: ACTIVE | COMMUNITY

## 2024-09-18 ENCOUNTER — TELEPHONE ENCOUNTER (OUTPATIENT)
Dept: URBAN - METROPOLITAN AREA CLINIC 98 | Facility: CLINIC | Age: 84
End: 2024-09-18

## 2024-09-18 ENCOUNTER — LAB OUTSIDE AN ENCOUNTER (OUTPATIENT)
Dept: URBAN - METROPOLITAN AREA CLINIC 98 | Facility: CLINIC | Age: 84
End: 2024-09-18

## 2024-09-18 RX ORDER — HYDROXYZINE HYDROCHLORIDE 10 MG/1
1 TABLET AS NEEDED TABLET, FILM COATED ORAL
Qty: 360 | Refills: 0

## 2024-12-26 ENCOUNTER — LAB OUTSIDE AN ENCOUNTER (OUTPATIENT)
Dept: URBAN - METROPOLITAN AREA CLINIC 98 | Facility: CLINIC | Age: 84
End: 2024-12-26

## 2024-12-27 ENCOUNTER — LAB OUTSIDE AN ENCOUNTER (OUTPATIENT)
Dept: URBAN - METROPOLITAN AREA CLINIC 98 | Facility: CLINIC | Age: 84
End: 2024-12-27

## 2024-12-27 LAB
ALBUMIN: 3.8
ALKALINE PHOSPHATASE: 108
ALT (SGPT): 5
AST (SGOT): 24
BASO (ABSOLUTE): 0.1
BASOS: 1
BILIRUBIN, TOTAL: 0.6
BUN/CREATININE RATIO: 21
BUN: 22
CALCIUM: 9.5
CARBON DIOXIDE, TOTAL: 22
CHLORIDE: 108
CREATININE: 1.03
EGFR: 54
EOS (ABSOLUTE): 0.5
EOS: 10
GGT: 5
GLOBULIN, TOTAL: 2.3
GLUCOSE: 95
HEMATOCRIT: 42.6
HEMATOLOGY COMMENTS:: (no result)
HEMOGLOBIN: 14
IMMATURE CELLS: (no result)
IMMATURE GRANS (ABS): 0
IMMATURE GRANULOCYTES: 0
LYMPHS (ABSOLUTE): 1.5
LYMPHS: 29
MCH: 32
MCHC: 32.9
MCV: 98
MONOCYTES(ABSOLUTE): 0.5
MONOCYTES: 10
NEUTROPHILS (ABSOLUTE): 2.4
NEUTROPHILS: 50
NRBC: (no result)
PLATELETS: 141
POTASSIUM: 5
PROTEIN, TOTAL: 6.1
RBC: 4.37
RDW: 12.1
SODIUM: 144
WBC: 5

## 2025-01-07 ENCOUNTER — OFFICE VISIT (OUTPATIENT)
Dept: URBAN - METROPOLITAN AREA CLINIC 98 | Facility: CLINIC | Age: 85
End: 2025-01-07
Payer: MEDICARE

## 2025-01-07 VITALS
BODY MASS INDEX: 41 KG/M2 | HEIGHT: 62 IN | SYSTOLIC BLOOD PRESSURE: 182 MMHG | DIASTOLIC BLOOD PRESSURE: 90 MMHG | TEMPERATURE: 97.2 F | HEART RATE: 64 BPM | WEIGHT: 222.8 LBS

## 2025-01-07 DIAGNOSIS — L29.9 PRURITUS: ICD-10-CM

## 2025-01-07 DIAGNOSIS — D69.6 THROMBOCYTOPENIA: ICD-10-CM

## 2025-01-07 DIAGNOSIS — K74.3 PRIMARY BILIARY CHOLANGITIS: ICD-10-CM

## 2025-01-07 DIAGNOSIS — K59.09 CHRONIC CONSTIPATION: ICD-10-CM

## 2025-01-07 DIAGNOSIS — E55.9 VITAMIN D DEFICIENCY: ICD-10-CM

## 2025-01-07 PROCEDURE — 99214 OFFICE O/P EST MOD 30 MIN: CPT | Performed by: INTERNAL MEDICINE

## 2025-01-07 RX ORDER — LOSARTAN POTASSIUM 100 MG/1
TABLET, FILM COATED ORAL
Qty: 0 | Refills: 0 | Status: ACTIVE | COMMUNITY
Start: 1900-01-01

## 2025-01-07 RX ORDER — HYDROXYZINE HYDROCHLORIDE 10 MG/1
1 TABLET AS NEEDED TABLET, FILM COATED ORAL
Qty: 360 | Refills: 0 | Status: ACTIVE | COMMUNITY

## 2025-01-07 RX ORDER — URSODIOL 500 MG/1
1 TABLET TABLET ORAL TWICE A DAY
Qty: 180 | Refills: 3 | Status: ACTIVE | COMMUNITY

## 2025-01-07 NOTE — HPI-TODAY'S VISIT:
Here to follow up  physically doing ok  insurance not covering hydroxyzine so she is on benedryl did have a CT scan after her colonoscopy  9/2024 IMPRESSION:  1. No acute intra-abdominal or intrapelvic abnormality.  2. Findings compatible with mild bruising of the lower ventral abdominal wall. No evidence ofsubcutaneous fluid collection or intraperitoneal involvement.  3. Possible fluid within the endometrium versus endometrial thickening. Further characterization withpelvic sonography is suggested. . saw PCP who did not address : planning to see GYN soon

## 2025-03-24 ENCOUNTER — ERX REFILL RESPONSE (OUTPATIENT)
Dept: URBAN - METROPOLITAN AREA CLINIC 98 | Facility: CLINIC | Age: 85
End: 2025-03-24

## 2025-03-24 RX ORDER — URSODIOL 500 MG/1
1 TABLET TABLET ORAL TWICE A DAY
Qty: 180 | Refills: 3

## 2025-07-07 ENCOUNTER — LAB OUTSIDE AN ENCOUNTER (OUTPATIENT)
Dept: URBAN - METROPOLITAN AREA CLINIC 98 | Facility: CLINIC | Age: 85
End: 2025-07-07

## 2025-07-09 LAB
ALBUMIN: 3.6
ALKALINE PHOSPHATASE: 92
ALT (SGPT): 14
AST (SGOT): 25
BASO (ABSOLUTE): 0.1
BASOS: 1
BILIRUBIN, TOTAL: 0.5
BUN/CREATININE RATIO: 25
BUN: 30
CALCIUM: 9.3
CARBON DIOXIDE, TOTAL: 24
CHLORIDE: 111
CREATININE: 1.19
EGFR: 46
EOS (ABSOLUTE): 0.3
EOS: 6
GGT: 5
GLOBULIN, TOTAL: 2.1
GLUCOSE: 101
HEMATOCRIT: 40.9
HEMATOLOGY COMMENTS:: (no result)
HEMOGLOBIN: 13.4
IMMATURE CELLS: (no result)
IMMATURE GRANS (ABS): 0
IMMATURE GRANULOCYTES: 0
LYMPHS (ABSOLUTE): 1.1
LYMPHS: 24
MCH: 33.3
MCHC: 32.8
MCV: 102
MONOCYTES(ABSOLUTE): 0.4
MONOCYTES: 9
NEUTROPHILS (ABSOLUTE): 2.9
NEUTROPHILS: 60
NRBC: (no result)
PLATELETS: 171
POTASSIUM: 5
PROTEIN, TOTAL: 5.7
RBC: 4.03
RDW: 13.7
SODIUM: 144
VITAMIN D, 25-HYDROXY: 30.9
WBC: 4.8

## 2025-07-15 ENCOUNTER — OFFICE VISIT (OUTPATIENT)
Dept: URBAN - METROPOLITAN AREA CLINIC 98 | Facility: CLINIC | Age: 85
End: 2025-07-15
Payer: MEDICARE

## 2025-07-15 DIAGNOSIS — K74.3 PRIMARY BILIARY CHOLANGITIS: ICD-10-CM

## 2025-07-15 DIAGNOSIS — K59.09 CHRONIC CONSTIPATION: ICD-10-CM

## 2025-07-15 DIAGNOSIS — L29.9 PRURITUS: ICD-10-CM

## 2025-07-15 DIAGNOSIS — D69.6 THROMBOCYTOPENIA: ICD-10-CM

## 2025-07-15 DIAGNOSIS — E55.9 VITAMIN D DEFICIENCY: ICD-10-CM

## 2025-07-15 PROCEDURE — 99214 OFFICE O/P EST MOD 30 MIN: CPT | Performed by: INTERNAL MEDICINE

## 2025-07-15 RX ORDER — URSODIOL 500 MG/1
1 TABLET TABLET ORAL TWICE A DAY
Qty: 180 | Refills: 3 | Status: ACTIVE | COMMUNITY

## 2025-07-15 RX ORDER — HYDROXYZINE HYDROCHLORIDE 10 MG/1
1 TABLET AS NEEDED TABLET, FILM COATED ORAL
Qty: 360 | Refills: 0 | Status: ACTIVE | COMMUNITY

## 2025-07-15 RX ORDER — HYDROXYZINE HYDROCHLORIDE 25 MG/1
1 TABLET AS NEEDED TABLET, FILM COATED ORAL
Qty: 90 | Refills: 1

## 2025-07-15 RX ORDER — LOSARTAN POTASSIUM 100 MG/1
TABLET, FILM COATED ORAL
Qty: 0 | Refills: 0 | Status: ACTIVE | COMMUNITY
Start: 1900-01-01

## 2025-07-15 NOTE — HPI-TODAY'S VISIT:
stopped hydroxyzine bc insurance didn't pay for it but it was working  feels congested at times  has gained wt as she has been sedentary